# Patient Record
(demographics unavailable — no encounter records)

---

## 2020-06-21 NOTE — EDM.PDOC
ED HPI GENERAL MEDICAL PROBLEM





- General


Chief Complaint: Cardiovascular Problem


Stated Complaint: MEDICAL VIA NORTH


Time Seen by Provider: 06/21/20 19:38


Source of Information: Reports: Patient, EMS, Nursing Home Records


History Limitations: Reports: Other (minimal old records)





- History of Present Illness


INITIAL COMMENTS - FREE TEXT/NARRATIVE: 





70 yo male here with self-limiting chest pains that began at the [facility] that he is 

currently staying at while healing from his recent heart attack. He was 

hospitalized in [place] for his MI. He is unable to tell me anything about that 

hospitalization. His home is in [facility] and he lives alone. He does not have NTG on

his list of meds and was not given any before arriving in the ER. He has a DNR 

code status. EMS noted stable vitals and only mild chest pain. ASA was given per

EMS. On arrival in the ER his CP is gone. He does not recall any nausea, 

diaphoresis, or worsening SOB tonight. He cannot tell me how long he had his 

pain before it resolved.





Is a smoker and has no plans to quite.


Onset: Today


Onset Date: 06/21/20


Duration: Other (unknown)


Location: Reports: Chest


Quality: Reports: Other (mild tightness)


Severity: Mild


Improves with: Reports: Other (time)


Worsens with: Reports: Other (unknown)


Context: Reports: Other (See HPI)


Associated Symptoms: Reports: Chest Pain


Treatments PTA: Reports: Aspirin


  ** chest


Pain Score (Numeric/FACES): 8





- Related Data


                                    Allergies











Allergy/AdvReac Type Severity Reaction Status Date / Time


 


Latex, Natural Rubber Allergy  Other Verified 06/21/20 20:21


 


levofloxacin Allergy  Other Verified 06/21/20 20:21


 


lisinopril Allergy  Other Verified 06/21/20 20:21


 


potassium Allergy  Other Verified 06/21/20 20:21











Home Meds: 


                                    Home Meds





Albuterol Sulfate [Albuterol Sulfate Hfa] 8.5 gm IH Q4H PRN 06/21/20 [History]


Budesonide/Formoterol [Symbicort 80-4.5 MCG] 2 puff INH BID 06/21/20 [History]


Clopidogrel Bisulfate [Plavix] 75 mg PO DAILY 06/21/20 [History]


Finasteride [Proscar] 5 mg PO DAILY 06/21/20 [History]


Omeprazole 20 mg PO BID 06/21/20 [History]


Sennosides/Docusate Sodium [Senna Plus 8.6-50 mg Tablet] 2 tab PO DAILY 06/21/20

[History]


Tamsulosin [Tamsulosin 24 Hr] 0.4 mg PO DAILY 06/21/20 [History]


Tiotropium [Spiriva] 18 mcg INH BID 06/21/20 [History]


atorvaSTATin [Lipitor] 80 mg PO BEDTIME 06/21/20 [History]











ED ROS GENERAL





- Review of Systems


Review Of Systems: See Below


Constitutional: Reports: No Symptoms


HEENT: Reports: No Symptoms


Respiratory: Reports: Shortness of Breath (chronic, not worse)


Cardiovascular: Reports: Chest Pain (resolved on arrival).  Denies: 

Claudication, Dyspnea on Exertion, Edema, Orthopnea, Palpitations, PND, Syncope


Endocrine: Reports: No Symptoms


GI/Abdominal: Reports: No Symptoms


: Reports: No Symptoms


Musculoskeletal: Reports: No Symptoms


Skin: Reports: No Symptoms


Neurological: Reports: No Symptoms


Psychiatric: Reports: No Symptoms





ED EXAM, GENERAL





- Physical Exam


Exam: See Below


Exam Limited By: No Limitations


General Appearance: Alert, WD/WN, No Apparent Distress, Thin


Eye Exam: Bilateral Eye: Normal Inspection


Ears: Normal External Exam, Normal Canal, Hearing Grossly Normal, Hearing Loss 

(bilateral hearing aids)


Ear Exam: Bilateral Ear: Auricle Normal, Canal Normal


Nose: Normal Inspection, No Blood


Throat/Mouth: Normal Inspection, Normal Lips, Normal Oropharynx, Normal Voice, 

No Airway Compromise


Head: Atraumatic, Normocephalic


Neck: Normal Inspection


Respiratory/Chest: No Respiratory Distress, Lungs Clear, No Accessory Muscle 

Use, Chest Non-Tender, Decreased Breath Sounds.  No: Respiratory Distress, 

Crackles, Rales, Rhonchi, Wheezing


Cardiovascular: Regular Rate, Rhythm, No Edema


GI/Abdominal: Normal Bowel Sounds, Soft, Non-Tender, No Distention


Back Exam: Normal Inspection.  No: CVA Tenderness (R), CVA Tenderness (L)


Extremities: Normal Inspection, Normal Range of Motion, Non-Tender, No Pedal 

Edema.  No: Pedal Edema


Neurological: Alert, Oriented, CN II-XII Intact, Normal Cognition, No 

Motor/Sensory Deficits


Psychiatric: Normal Affect, Normal Mood


Skin Exam: Warm, Dry, Intact, Normal Color, No Rash





EKG INTERPRETATION


EKG Date: 06/21/20


Time: 19:25


Rhythm: NSR


Rate (Beats/Min): 55


Axis: Normal


P-Wave: Present


QRS: Other (L anterior fascicular block)


ST-T: Normal


QT: Normal


Comparison: NA - No Prior EKG


EKG Interpretation Comments: 





flipped T's inferior leads, unknown age. 




















EKG repeated @ 2043h due to a recurrence of his chest pain, this was completely 

unchanged from his initial EKG done when pain free. 





Course





- Vital Signs


Text/Narrative:: 





Was admitted to [place] for a Non-STEMI, heart cath there showed non-obstructive

 dz. CT of chest was non contributory. Endoscopy showed severe esophagitis and 

he was started on a PPI. 


Last Recorded V/S: 


                                Last Vital Signs











Temp  36.7 C   06/21/20 20:38


 


Pulse  52 L  06/21/20 22:51


 


Resp  14   06/21/20 22:51


 


BP  118/57 L  06/21/20 22:51


 


Pulse Ox  98   06/21/20 22:51














- Orders/Labs/Meds


Orders: 


                               Active Orders 24 hr











 Category Date Time Status


 


 Cardiac Monitoring [RC] .As Directed Care  06/21/20 19:35 Active


 


 EKG Documentation Completion [RC] ASDIRECTED Care  06/21/20 19:35 Active


 


 EKG Documentation Completion [RC] ASDIRECTED Care  06/21/20 20:41 Active


 


 Nitroglycerin [Nitrostat] Med  06/21/20 20:40 Active





 0.4 mg SL Q5M PRN   


 


 EKG 12 Lead [EK] Routine Ther  06/21/20 19:35 Ordered


 


 EKG 12 Lead [EK] Routine Ther  06/21/20 20:41 Ordered








                                Medication Orders





Nitroglycerin (Nitrostat)  0.4 mg SL Q5M PRN


   PRN Reason: Chest Pain


   Last Admin: 06/21/20 21:17  Dose: 0.4 mg


   Documented by: LETA








Labs: 


                                Laboratory Tests











  06/21/20 06/21/20 06/21/20 Range/Units





  20:07 20:07 23:01 


 


WBC  10.2    (4.5-11.0)  K/uL


 


RBC  3.38 L    (4.30-5.90)  M/uL


 


Hgb  11.7 L    (12.0-15.0)  g/dL


 


Hct  36.5 L    (40.0-54.0)  %


 


MCV  108 H    (80-98)  fL


 


MCH  35 H    (27-31)  pg


 


MCHC  32    (32-36)  %


 


Plt Count  452 H    (150-400)  K/uL


 


Sodium   140   (140-148)  mmol/L


 


Potassium   3.8   (3.6-5.2)  mmol/L


 


Chloride   106   (100-108)  mmol/L


 


Carbon Dioxide   25   (21-32)  mmol/L


 


Anion Gap   9.3   (5.0-14.0)  mmol/L


 


BUN   16   (7-18)  mg/dL


 


Creatinine   0.9   (0.8-1.3)  mg/dL


 


Est Cr Clr Drug Dosing   53.13   mL/min


 


Estimated GFR (MDRD)   > 60   (>60)  


 


Glucose   111 H   ()  mg/dL


 


Calcium   8.3 L   (8.5-10.1)  mg/dL


 


Troponin I   < 0.017  < 0.017  (0.000-0.056)  ng/mL











Meds: 


Medications











Generic Name Dose Route Start Last Admin





  Trade Name Freq  PRN Reason Stop Dose Admin


 


Nitroglycerin  0.4 mg  06/21/20 20:40  06/21/20 21:17





  Nitrostat  SL   0.4 mg





  Q5M PRN   Administration





  Chest Pain  














Discontinued Medications














Generic Name Dose Route Start Last Admin





  Trade Name Freq  PRN Reason Stop Dose Admin


 


Acetaminophen  1,000 mg  06/21/20 22:06  06/21/20 22:16





  Tylenol Extra Strength  PO  06/21/20 22:07  1,000 mg





  ONETIME ONE   Administration


 


Al Hydroxide/Mg Hydroxide 15  0 ml  06/21/20 20:55  06/21/20 21:07





  ml/ Lidocaine HCl 15 ml  PO  06/21/20 20:56  30 ml





  ONETIME ONE   Administration


 


Sodium Chloride  500 mls @ 999 mls/hr  06/21/20 21:36  06/21/20 21:30





  Normal Saline  IV  06/21/20 22:06  999 mls/hr





  .BOLUS ONE   Administration


 


Morphine Sulfate  2 mg  06/21/20 21:22  06/21/20 21:35





  Morphine  IVPUSH  06/21/20 21:23  2 mg





  ONETIME ONE   Administration


 


Morphine Sulfate  2 mg  06/21/20 22:34  06/21/20 22:42





  Morphine  IVPUSH  06/21/20 22:35  2 mg





  ONETIME ONE   Administration


 


Sucralfate  1 gm  06/21/20 22:07  06/21/20 22:16





  Carafate  PO  06/21/20 22:08  1 gm





  ONETIME ONE   Administration














- Re-Assessments/Exams


Free Text/Narrative Re-Assessment/Exam: 





06/21/20 21:46


Pain returned in the ER and he did not get relief with either NTG SL or GI 

cocktail po. Will give MS. Awaiting records from his recent [place] admission. 





Departure





- Departure


Time of Disposition: 23:30


Disposition: Home, Self-Care 01


Condition: Fair


Clinical Impression: 


 Esophagitis





Instructions:  Esophagitis


Referrals: 


Emeka Lovett MD [Primary Care Provider] - 


Forms:  ED Department Discharge


Additional Instructions: 


Acetaminophen up to 650 mg every 4 hrs for pain relief. Give Gaviscon 30 ml up 

to 4 times a day for heart burn symptoms. Continue his other medicines as 

currently. Recheck with primary care later in the week. Return as needed. 





Sepsis Event Note (ED)





- Focused Exam


Vital Signs: 


                                   Vital Signs











  Temp Pulse Resp BP BP Pulse Ox


 


 06/21/20 22:51   52 L  14   118/57 L  98


 


 06/21/20 21:59   53 L  10 L   120/60  98


 


 06/21/20 21:37   54 L  16   108/55 L  97


 


 06/21/20 21:17     109/61  


 


 06/21/20 20:58   53 L  12   109/61  99


 


 06/21/20 20:54   55 L  13   93/51 L  98


 


 06/21/20 20:38  36.7 C  58 L  20   115/61  99


 


 06/21/20 19:40  36.7 C  58 L  20   115/61  99














- My Orders


Last 24 Hours: 


My Active Orders





06/21/20 19:35


Cardiac Monitoring [RC] .As Directed 


EKG Documentation Completion [RC] ASDIRECTED 


EKG 12 Lead [EK] Routine 





06/21/20 20:40


Nitroglycerin [Nitrostat]   0.4 mg SL Q5M PRN 





06/21/20 20:41


EKG Documentation Completion [RC] ASDIRECTED 


EKG 12 Lead [EK] Routine 














- Assessment/Plan


Last 24 Hours: 


My Active Orders





06/21/20 19:35


Cardiac Monitoring [RC] .As Directed 


EKG Documentation Completion [RC] ASDIRECTED 


EKG 12 Lead [EK] Routine 





06/21/20 20:40


Nitroglycerin [Nitrostat]   0.4 mg SL Q5M PRN 





06/21/20 20:41


EKG Documentation Completion [RC] ASDIRECTED 


EKG 12 Lead [EK] Routine